# Patient Record
Sex: FEMALE | URBAN - METROPOLITAN AREA
[De-identification: names, ages, dates, MRNs, and addresses within clinical notes are randomized per-mention and may not be internally consistent; named-entity substitution may affect disease eponyms.]

---

## 2019-11-05 ENCOUNTER — OFFICE VISIT (OUTPATIENT)
Dept: FAMILY MEDICINE CLINIC | Age: 44
End: 2019-11-05

## 2019-11-05 VITALS
DIASTOLIC BLOOD PRESSURE: 77 MMHG | TEMPERATURE: 98.3 F | BODY MASS INDEX: 24.01 KG/M2 | HEART RATE: 70 BPM | RESPIRATION RATE: 16 BRPM | WEIGHT: 149.4 LBS | OXYGEN SATURATION: 99 % | SYSTOLIC BLOOD PRESSURE: 113 MMHG | HEIGHT: 66 IN

## 2019-11-05 DIAGNOSIS — Z00.00 ROUTINE GENERAL MEDICAL EXAMINATION AT A HEALTH CARE FACILITY: Primary | ICD-10-CM

## 2019-11-05 DIAGNOSIS — Z76.89 ENCOUNTER TO ESTABLISH CARE: ICD-10-CM

## 2019-11-05 DIAGNOSIS — Z80.3 FAMILY HISTORY OF BREAST CANCER IN SISTER: ICD-10-CM

## 2019-11-05 NOTE — PROGRESS NOTES
Chaparro Trevino is a 45 y.o. female (: 1975) presenting to address:    Chief Complaint   Patient presents with    New Patient       Vitals:    19 1623   BP: 113/77   Pulse: 70   Resp: 16   Temp: 98.3 °F (36.8 °C)   TempSrc: Oral   SpO2: 99%   Weight: 149 lb 6.4 oz (67.8 kg)   Height: 5' 6.11\" (1.679 m)   LMP: 10/13/2019       Hearing/Vision:   No exam data present    Learning Assessment:     Learning Assessment 2019   PRIMARY LEARNER Patient   HIGHEST LEVEL OF EDUCATION - PRIMARY LEARNER  > 4 YEARS OF COLLEGE   CO-LEARNER CAREGIVER No   PRIMARY LANGUAGE ENGLISH   LEARNER PREFERENCE PRIMARY DEMONSTRATION     PICTURES     VIDEOS   ANSWERED BY patient   RELATIONSHIP SELF     Depression Screening:     3 most recent PHQ Screens 2019   Little interest or pleasure in doing things Not at all   Feeling down, depressed, irritable, or hopeless Not at all   Total Score PHQ 2 0     Fall Risk Assessment:     Fall Risk Assessment, last 12 mths 2019   Able to walk? Yes   Fall in past 12 months? No     Abuse Screening:     Abuse Screening Questionnaire 2019   Do you ever feel afraid of your partner? N   Are you in a relationship with someone who physically or mentally threatens you? N   Is it safe for you to go home? Y     Coordination of Care Questionaire:   1. Have you been to the ER, urgent care clinic since your last visit? Hospitalized since your last visit? NO    2. Have you seen or consulted any other health care providers outside of the [address] [place] [address] since your last visit? Include any pap smears or colon screening. YES OBGYN in [place]    Advanced Directive:   1. Do you have an Advanced Directive? NO    2. Would you like information on Advanced Directives?  NO

## 2019-11-08 ENCOUNTER — HOSPITAL ENCOUNTER (OUTPATIENT)
Dept: LAB | Age: 44
Discharge: HOME | End: 2019-11-08
Payer: OTHER GOVERNMENT

## 2019-11-08 DIAGNOSIS — Z00.00 ROUTINE GENERAL MEDICAL EXAMINATION AT A HEALTH CARE FACILITY: ICD-10-CM

## 2019-11-08 LAB
ALBUMIN SERPL-MCNC: 4.7 G/DL (ref 3.4–5)
ALBUMIN/GLOB SERPL: 1.6 {RATIO} (ref 0.8–1.7)
ALP SERPL-CCNC: 66 U/L (ref 45–117)
ALT SERPL-CCNC: 24 U/L (ref 13–56)
ANION GAP SERPL CALC-SCNC: 6 MMOL/L (ref 3–18)
AST SERPL-CCNC: 14 U/L (ref 10–38)
BILIRUB DIRECT SERPL-MCNC: 0.2 MG/DL (ref 0–0.2)
BILIRUB SERPL-MCNC: 1 MG/DL (ref 0.2–1)
BUN SERPL-MCNC: 11 MG/DL (ref 7–18)
BUN/CREAT SERPL: 13 (ref 12–20)
CALCIUM SERPL-MCNC: 8.9 MG/DL (ref 8.5–10.1)
CHLORIDE SERPL-SCNC: 104 MMOL/L (ref 100–111)
CHOLEST SERPL-MCNC: 195 MG/DL
CO2 SERPL-SCNC: 30 MMOL/L (ref 21–32)
CREAT SERPL-MCNC: 0.86 MG/DL (ref 0.6–1.3)
ERYTHROCYTE [DISTWIDTH] IN BLOOD BY AUTOMATED COUNT: 13.4 % (ref 11.6–14.5)
EST. AVERAGE GLUCOSE BLD GHB EST-MCNC: 105 MG/DL
GLOBULIN SER CALC-MCNC: 2.9 G/DL (ref 2–4)
GLUCOSE SERPL-MCNC: 103 MG/DL (ref 74–99)
HBA1C MFR BLD: 5.3 % (ref 4.2–5.6)
HCT VFR BLD AUTO: 41.7 % (ref 35–45)
HDLC SERPL-MCNC: 67 MG/DL (ref 40–60)
HDLC SERPL: 2.9 {RATIO} (ref 0–5)
HGB BLD-MCNC: 13.3 G/DL (ref 12–16)
LDLC SERPL CALC-MCNC: 108 MG/DL (ref 0–100)
LIPID PROFILE,FLP: ABNORMAL
MCH RBC QN AUTO: 30 PG (ref 24–34)
MCHC RBC AUTO-ENTMCNC: 31.9 G/DL (ref 31–37)
MCV RBC AUTO: 93.9 FL (ref 74–97)
PLATELET # BLD AUTO: 270 K/UL (ref 135–420)
PMV BLD AUTO: 10.8 FL (ref 9.2–11.8)
POTASSIUM SERPL-SCNC: 4.3 MMOL/L (ref 3.5–5.5)
PROT SERPL-MCNC: 7.6 G/DL (ref 6.4–8.2)
RBC # BLD AUTO: 4.44 M/UL (ref 4.2–5.3)
SODIUM SERPL-SCNC: 140 MMOL/L (ref 136–145)
T4 FREE SERPL-MCNC: 1 NG/DL (ref 0.7–1.5)
TRIGL SERPL-MCNC: 100 MG/DL (ref ?–150)
TSH SERPL DL<=0.05 MIU/L-ACNC: 1.52 UIU/ML (ref 0.36–3.74)
VLDLC SERPL CALC-MCNC: 20 MG/DL
WBC # BLD AUTO: 6.9 K/UL (ref 4.6–13.2)

## 2019-11-08 PROCEDURE — 80061 LIPID PANEL: CPT

## 2019-11-08 PROCEDURE — 85027 COMPLETE CBC AUTOMATED: CPT

## 2019-11-08 PROCEDURE — 36415 COLL VENOUS BLD VENIPUNCTURE: CPT

## 2019-11-08 PROCEDURE — 84439 ASSAY OF FREE THYROXINE: CPT

## 2019-11-08 PROCEDURE — 83036 HEMOGLOBIN GLYCOSYLATED A1C: CPT

## 2019-11-08 PROCEDURE — 80048 BASIC METABOLIC PNL TOTAL CA: CPT

## 2019-11-08 PROCEDURE — 80076 HEPATIC FUNCTION PANEL: CPT

## 2019-11-08 PROCEDURE — 84443 ASSAY THYROID STIM HORMONE: CPT

## 2019-11-12 NOTE — PATIENT INSTRUCTIONS
Well Visit, Ages 25 to 48: Care Instructions  Your Care Instructions    Physical exams can help you stay healthy. Your doctor has checked your overall health and may have suggested ways to take good care of yourself. He or she also may have recommended tests. At home, you can help prevent illness with healthy eating, regular exercise, and other steps. Follow-up care is a key part of your treatment and safety. Be sure to make and go to all appointments, and call your doctor if you are having problems. It's also a good idea to know your test results and keep a list of the medicines you take. How can you care for yourself at home? · Reach and stay at a healthy weight. This will lower your risk for many problems, such as obesity, diabetes, heart disease, and high blood pressure. · Get at least 30 minutes of physical activity on most days of the week. Walking is a good choice. You also may want to do other activities, such as running, swimming, cycling, or playing tennis or team sports. Discuss any changes in your exercise program with your doctor. · Do not smoke or allow others to smoke around you. If you need help quitting, talk to your doctor about stop-smoking programs and medicines. These can increase your chances of quitting for good. · Talk to your doctor about whether you have any risk factors for sexually transmitted infections (STIs). Having one sex partner (who does not have STIs and does not have sex with anyone else) is a good way to avoid these infections. · Use birth control if you do not want to have children at this time. Talk with your doctor about the choices available and what might be best for you. · Protect your skin from too much sun. When you're outdoors from 10 a.m. to 4 p.m., stay in the shade or cover up with clothing and a hat with a wide brim. Wear sunglasses that block UV rays. Even when it's cloudy, put broad-spectrum sunscreen (SPF 30 or higher) on any exposed skin.   · See a dentist one or two times a year for checkups and to have your teeth cleaned. · Wear a seat belt in the car. Follow your doctor's advice about when to have certain tests. These tests can spot problems early. For everyone  · Cholesterol. Have the fat (cholesterol) in your blood tested after age 21. Your doctor will tell you how often to have this done based on your age, family history, or other things that can increase your risk for heart disease. · Blood pressure. Have your blood pressure checked during a routine doctor visit. Your doctor will tell you how often to check your blood pressure based on your age, your blood pressure results, and other factors. · Vision. Talk with your doctor about how often to have a glaucoma test.  · Diabetes. Ask your doctor whether you should have tests for diabetes. · Colon cancer. Your risk for colorectal cancer gets higher as you get older. Some experts say that adults should start regular screening at age 48 and stop at age 76. Others say to start before age 48 or continue after age 76. Talk with your doctor about your risk and when to start and stop screening. For women  · Breast exam and mammogram. Talk to your doctor about when you should have a clinical breast exam and a mammogram. Medical experts differ on whether and how often women under 50 should have these tests. Your doctor can help you decide what is right for you. · Cervical cancer screening test and pelvic exam. Begin with a Pap test at age 24. The test often is part of a pelvic exam. Starting at age 27, you may choose to have a Pap test, an HPV test, or both tests at the same time (called co-testing). Talk with your doctor about how often to have testing. · Tests for sexually transmitted infections (STIs). Ask whether you should have tests for STIs. You may be at risk if you have sex with more than one person, especially if your partners do not wear condoms.   For men  · Tests for sexually transmitted infections (STIs). Ask whether you should have tests for STIs. You may be at risk if you have sex with more than one person, especially if you do not wear a condom. · Testicular cancer exam. Ask your doctor whether you should check your testicles regularly. · Prostate exam. Talk to your doctor about whether you should have a blood test (called a PSA test) for prostate cancer. Experts differ on whether and when men should have this test. Some experts suggest it if you are older than 39 and are -American or have a father or brother who got prostate cancer when he was younger than 72. When should you call for help? Watch closely for changes in your health, and be sure to contact your doctor if you have any problems or symptoms that concern you. Where can you learn more? Go to http://jase-joe.info/. Enter P072 in the search box to learn more about \"Well Visit, Ages 25 to 48: Care Instructions. \"  Current as of: December 13, 2018  Content Version: 12.2  © 6722-5029 [employer]. Care instructions adapted under license by [employer] (which disclaims liability or warranty for this information). If you have questions about a medical condition or this instruction, always ask your healthcare professional. [place] [address] any warranty or liability for your use of this information.

## 2019-11-12 NOTE — PROGRESS NOTES
Subjective:   45 y.o. female for Well Woman Check. Her gyne and breast care is done. Patient Pap up-to-date last pregnancy less than 2 years ago. Patient's breast care overdue patient unable to get mammos due to breast-feeding times the last 5 to 7 years. Current Outpatient Medications   Medication Sig Dispense Refill    multivitamin (ONE-A-DAY ESSENTIAL PO) Take  by mouth. Allergies   Allergen Reactions    Penicillins Rash     History reviewed. No pertinent past medical history. Past Surgical History:   Procedure Laterality Date    HX ABDOMINAL LAPAROSCOPY      HX ACL RECONSTRUCTION      HX  SECTION  2018     Family History   Problem Relation Age of Onset    High Cholesterol Mother     Other Father     Breast Cancer Sister     Depression Sister      Social History     Tobacco Use    Smoking status: Never Smoker    Smokeless tobacco: Never Used   Substance Use Topics    Alcohol use: Yes     Alcohol/week: 2.0 - 4.0 standard drinks     Types: 1 - 2 Glasses of wine, 1 - 2 Cans of beer per week        Lab Results   Component Value Date/Time    WBC 6.9 2019 10:10 AM    HGB 13.3 2019 10:10 AM    HCT 41.7 2019 10:10 AM    PLATELET 288 5530 10:10 AM    MCV 93.9 2019 10:10 AM     Lab Results   Component Value Date/Time    Hemoglobin A1c 5.3 2019 10:10 AM    Glucose 103 (H) 2019 10:10 AM    LDL, calculated 108 (H) 2019 10:10 AM    Creatinine 0.86 2019 10:10 AM      Lab Results   Component Value Date/Time    Cholesterol, total 195 2019 10:10 AM    HDL Cholesterol 67 (H) 2019 10:10 AM    LDL, calculated 108 (H) 2019 10:10 AM    Triglyceride 100 2019 10:10 AM    CHOL/HDL Ratio 2.9 2019 10:10 AM     Lab Results   Component Value Date/Time    ALT (SGPT) 24 2019 10:10 AM    AST (SGOT) 14 2019 10:10 AM    Alk.  phosphatase 66 2019 10:10 AM    Bilirubin, direct 0.2 2019 10:10 AM Bilirubin, total 1.0 11/08/2019 10:10 AM    Albumin 4.7 11/08/2019 10:10 AM    Protein, total 7.6 11/08/2019 10:10 AM    PLATELET 451 97/76/7929 10:10 AM     Lab Results   Component Value Date/Time    TSH 1.52 11/08/2019 10:10 AM    T4, Free 1.0 11/08/2019 10:10 AM         Specific concerns today: Patient sister recently diagnosed with breast cancer. Patient was advised to do genetics testing which she has completed up in [facility] previous to moving here. Since [employer] they are here due to his deployment. Patient was advised due to breast-feeding could not have mammography has not ever had baseline mammography due to breast-feeding. Patient requesting MRI of bilateral breasts per recommendation of OB/GYN in [place]. Records requested. Review of Systems  A comprehensive review of systems was negative. Objective:   Blood pressure 113/77, pulse 70, temperature 98.3 °F (36.8 °C), temperature source Oral, resp. rate 16, height 5' 6.11\" (1.679 m), weight 149 lb 6.4 oz (67.8 kg), last menstrual period 10/13/2019, SpO2 99 %.    Physical Examination:   General appearance - alert, well appearing, and in no distress  Mental status - alert, oriented to person, place, and time, normal mood, behavior, speech, dress, motor activity, and thought processes  Eyes - pupils equal and reactive, extraocular eye movements intact  Ears - bilateral TM's and external ear canals normal  Nose - normal and patent, no erythema, discharge or polyps  Mouth - mucous membranes moist, pharynx normal without lesions  Neck - supple, no significant adenopathy  Lymphatics - no palpable lymphadenopathy, no hepatosplenomegaly  Chest - clear to auscultation, no wheezes, rales or rhonchi, symmetric air entry  Heart - normal rate, regular rhythm, normal S1, S2, no murmurs, rubs, clicks or gallops  Abdomen - soft, nontender, nondistended, no masses or organomegaly  Breasts - patient declines to have breast exam d/t breast feeding tenderness  Musculoskeletal - no joint tenderness, deformity or swelling  Extremities - peripheral pulses normal, no pedal edema, no clubbing or cyanosis  Skin - normal coloration and turgor, no rashes, no suspicious skin lesions noted     Assessment/Plan:   Records requested from OB/GYN in [facility] once reviewed will order testing associated with recommendation. Patient aware we will be following up once records received. continue present diet with no restrictions, continue present plan, routine labs ordered, call if any problems  Encounter Diagnoses   Name Primary?     Routine general medical examination at a health care facility Yes    Family history of breast cancer in sister Boubacar Luna Encounter to establish care      Orders Placed This Encounter    MRI BREAST BI W WO CONT    CBC W/O DIFF    METABOLIC PANEL, BASIC    HEPATIC FUNCTION PANEL    LIPID PANEL    TSH 3RD GENERATION    T4, FREE    HEMOGLOBIN A1C WITH EAG    multivitamin (ONE-A-DAY ESSENTIAL PO)     Return to care yearly and as needed

## 2019-12-04 ENCOUNTER — OFFICE VISIT (OUTPATIENT)
Dept: FAMILY MEDICINE CLINIC | Age: 44
End: 2019-12-04

## 2019-12-04 VITALS
DIASTOLIC BLOOD PRESSURE: 62 MMHG | HEIGHT: 66 IN | HEART RATE: 66 BPM | SYSTOLIC BLOOD PRESSURE: 101 MMHG | TEMPERATURE: 97.5 F | RESPIRATION RATE: 16 BRPM | BODY MASS INDEX: 24.11 KG/M2 | OXYGEN SATURATION: 98 % | WEIGHT: 150 LBS

## 2019-12-04 DIAGNOSIS — Z00.00 ROUTINE GENERAL MEDICAL EXAMINATION AT A HEALTH CARE FACILITY: Primary | ICD-10-CM

## 2019-12-04 DIAGNOSIS — Z80.3 FAMILY HISTORY OF BREAST CANCER IN SISTER: ICD-10-CM

## 2019-12-04 NOTE — PROGRESS NOTES
Sailaja Bai is a 45 y.o. female (: 1975) presenting to address:    Chief Complaint   Patient presents with    Complete Physical       Vitals:    19 1629   BP: 101/62   Pulse: 66   Resp: 16   Temp: 97.5 °F (36.4 °C)   TempSrc: Oral   SpO2: 98%   Weight: 150 lb (68 kg)   Height: 5' 6.11\" (1.679 m)   PainSc:   0 - No pain   LMP: 2019       Hearing/Vision:   No exam data present    Learning Assessment:     Learning Assessment 2019   PRIMARY LEARNER Patient   HIGHEST LEVEL OF EDUCATION - PRIMARY LEARNER  > 4 YEARS OF COLLEGE   CO-LEARNER CAREGIVER No   PRIMARY LANGUAGE ENGLISH   LEARNER PREFERENCE PRIMARY DEMONSTRATION     PICTURES     VIDEOS   ANSWERED BY patient   RELATIONSHIP SELF     Depression Screening:     3 most recent PHQ Screens 2019   Little interest or pleasure in doing things Not at all   Feeling down, depressed, irritable, or hopeless Not at all   Total Score PHQ 2 0     Fall Risk Assessment:     Fall Risk Assessment, last 12 mths 2019   Able to walk? Yes   Fall in past 12 months? No     Abuse Screening:     Abuse Screening Questionnaire 2019   Do you ever feel afraid of your partner? N   Are you in a relationship with someone who physically or mentally threatens you? N   Is it safe for you to go home? Y     Coordination of Care Questionaire:   1. Have you been to the ER, urgent care clinic since your last visit? Hospitalized since your last visit? NO    2. Have you seen or consulted any other health care providers outside of the [address] [place] [address] since your last visit? Include any pap smears or colon screening. YES    Advanced Directive:   1. Do you have an Advanced Directive? NO    2. Would you like information on Advanced Directives?  NO

## 2019-12-04 NOTE — PATIENT INSTRUCTIONS
Well Visit, Ages 25 to 48: Care Instructions  Your Care Instructions    Physical exams can help you stay healthy. Your doctor has checked your overall health and may have suggested ways to take good care of yourself. He or she also may have recommended tests. At home, you can help prevent illness with healthy eating, regular exercise, and other steps. Follow-up care is a key part of your treatment and safety. Be sure to make and go to all appointments, and call your doctor if you are having problems. It's also a good idea to know your test results and keep a list of the medicines you take. How can you care for yourself at home? · Reach and stay at a healthy weight. This will lower your risk for many problems, such as obesity, diabetes, heart disease, and high blood pressure. · Get at least 30 minutes of physical activity on most days of the week. Walking is a good choice. You also may want to do other activities, such as running, swimming, cycling, or playing tennis or team sports. Discuss any changes in your exercise program with your doctor. · Do not smoke or allow others to smoke around you. If you need help quitting, talk to your doctor about stop-smoking programs and medicines. These can increase your chances of quitting for good. · Talk to your doctor about whether you have any risk factors for sexually transmitted infections (STIs). Having one sex partner (who does not have STIs and does not have sex with anyone else) is a good way to avoid these infections. · Use birth control if you do not want to have children at this time. Talk with your doctor about the choices available and what might be best for you. · Protect your skin from too much sun. When you're outdoors from 10 a.m. to 4 p.m., stay in the shade or cover up with clothing and a hat with a wide brim. Wear sunglasses that block UV rays. Even when it's cloudy, put broad-spectrum sunscreen (SPF 30 or higher) on any exposed skin.   · See a dentist one or two times a year for checkups and to have your teeth cleaned. · Wear a seat belt in the car. Follow your doctor's advice about when to have certain tests. These tests can spot problems early. For everyone  · Cholesterol. Have the fat (cholesterol) in your blood tested after age 21. Your doctor will tell you how often to have this done based on your age, family history, or other things that can increase your risk for heart disease. · Blood pressure. Have your blood pressure checked during a routine doctor visit. Your doctor will tell you how often to check your blood pressure based on your age, your blood pressure results, and other factors. · Vision. Talk with your doctor about how often to have a glaucoma test.  · Diabetes. Ask your doctor whether you should have tests for diabetes. · Colon cancer. Your risk for colorectal cancer gets higher as you get older. Some experts say that adults should start regular screening at age 48 and stop at age 76. Others say to start before age 48 or continue after age 76. Talk with your doctor about your risk and when to start and stop screening. For women  · Breast exam and mammogram. Talk to your doctor about when you should have a clinical breast exam and a mammogram. Medical experts differ on whether and how often women under 50 should have these tests. Your doctor can help you decide what is right for you. · Cervical cancer screening test and pelvic exam. Begin with a Pap test at age 24. The test often is part of a pelvic exam. Starting at age 27, you may choose to have a Pap test, an HPV test, or both tests at the same time (called co-testing). Talk with your doctor about how often to have testing. · Tests for sexually transmitted infections (STIs). Ask whether you should have tests for STIs. You may be at risk if you have sex with more than one person, especially if your partners do not wear condoms.   For men  · Tests for sexually transmitted infections (STIs). Ask whether you should have tests for STIs. You may be at risk if you have sex with more than one person, especially if you do not wear a condom. · Testicular cancer exam. Ask your doctor whether you should check your testicles regularly. · Prostate exam. Talk to your doctor about whether you should have a blood test (called a PSA test) for prostate cancer. Experts differ on whether and when men should have this test. Some experts suggest it if you are older than 39 and are -American or have a father or brother who got prostate cancer when he was younger than 72. When should you call for help? Watch closely for changes in your health, and be sure to contact your doctor if you have any problems or symptoms that concern you. Where can you learn more? Go to http://jase-joe.info/. Enter P072 in the search box to learn more about \"Well Visit, Ages 25 to 48: Care Instructions. \"  Current as of: December 13, 2018  Content Version: 12.2  © 8925-4904 [employer]. Care instructions adapted under license by [employer] (which disclaims liability or warranty for this information). If you have questions about a medical condition or this instruction, always ask your healthcare professional. [place] [address] any warranty or liability for your use of this information.

## 2019-12-09 ENCOUNTER — TELEPHONE (OUTPATIENT)
Dept: FAMILY MEDICINE CLINIC | Age: 44
End: 2019-12-09

## 2019-12-09 NOTE — TELEPHONE ENCOUNTER
Please call imaging center and explain     Patient is  here from [facility] for 1 year with spouse who is here for [employer]. Patient has been breast-feeding over the last 7 years between pregnancies and has never had an initial mammogram.  Her OB/GYN recommended MRI as mamma would be in conclusive due to breast-feeding. Patient sister recently diagnosed with breast cancer patient's marker test is also positive. Patient's OB/GYN notes reviewed from [facility] with recommendation of MRI due to these factors order placed for that reason should be able to facilitate this based off of history, current genetic testing, and lack of mammogram due to childbearing years.

## 2019-12-09 NOTE — TELEPHONE ENCOUNTER
Jessenia Rodgers from [facility] called stating that this patient doesn't have any current imaging on file and due to this the insurance might not pay for the MRI. The patient is scheduled for 12/13/19.  Please advise

## 2019-12-11 NOTE — TELEPHONE ENCOUNTER
Spoke with dillan and gave marie's message.    I also faxed the message along with [phone] notes  Confirmation received    Faxed to [phone]

## 2019-12-12 ENCOUNTER — TELEPHONE (OUTPATIENT)
Dept: FAMILY MEDICINE CLINIC | Age: 44
End: 2019-12-12

## 2019-12-16 ENCOUNTER — TELEPHONE (OUTPATIENT)
Dept: FAMILY MEDICINE CLINIC | Age: 44
End: 2019-12-16

## 2019-12-16 DIAGNOSIS — R92.8 ABNORMAL MRI, BREAST: Primary | ICD-10-CM

## 2019-12-16 NOTE — TELEPHONE ENCOUNTER
Alexsandra radiologist called from [address] and stated abnormal findings on MRI  Patient needs Mammo and ultrasound, then LT breast biopsy.

## 2019-12-17 NOTE — PROGRESS NOTES
Subjective:   45 y.o. female for Well Woman Check. Her gyne and breast care is done elsewhere by her Ob-Gyne physician. Current Outpatient Medications   Medication Sig Dispense Refill    multivitamin (ONE-A-DAY ESSENTIAL PO) Take  by mouth. Allergies   Allergen Reactions    Penicillins Rash     No past medical history on file. Past Surgical History:   Procedure Laterality Date    HX ABDOMINAL LAPAROSCOPY      HX ACL RECONSTRUCTION  1999    HX  SECTION  2018     Family History   Problem Relation Age of Onset    High Cholesterol Mother     Other Father     Breast Cancer Sister     Depression Sister      Social History     Tobacco Use    Smoking status: Never Smoker    Smokeless tobacco: Never Used   Substance Use Topics    Alcohol use: Yes     Alcohol/week: 2.0 - 4.0 standard drinks     Types: 1 - 2 Glasses of wine, 1 - 2 Cans of beer per week        Lab Results   Component Value Date/Time    WBC 6.9 2019 10:10 AM    HGB 13.3 2019 10:10 AM    HCT 41.7 2019 10:10 AM    PLATELET 030  10:10 AM    MCV 93.9 2019 10:10 AM     Lab Results   Component Value Date/Time    Hemoglobin A1c 5.3 2019 10:10 AM    Glucose 103 (H) 2019 10:10 AM    LDL, calculated 108 (H) 2019 10:10 AM    Creatinine 0.86 2019 10:10 AM      Lab Results   Component Value Date/Time    Cholesterol, total 195 2019 10:10 AM    HDL Cholesterol 67 (H) 2019 10:10 AM    LDL, calculated 108 (H) 2019 10:10 AM    Triglyceride 100 2019 10:10 AM    CHOL/HDL Ratio 2.9 2019 10:10 AM     Lab Results   Component Value Date/Time    ALT (SGPT) 24 2019 10:10 AM    AST (SGOT) 14 2019 10:10 AM    Alk.  phosphatase 66 2019 10:10 AM    Bilirubin, direct 0.2 2019 10:10 AM    Bilirubin, total 1.0 2019 10:10 AM    Albumin 4.7 2019 10:10 AM    Protein, total 7.6 2019 10:10 AM    PLATELET 995  10:10 AM     Lab Results   Component Value Date/Time    TSH 1.52 11/08/2019 10:10 AM    T4, Free 1.0 11/08/2019 10:10 AM         Review of Systems  A comprehensive review of systems was negative. Objective:   Blood pressure 101/62, pulse 66, temperature 97.5 °F (36.4 °C), temperature source Oral, resp. rate 16, height 5' 6.11\" (1.679 m), weight 150 lb (68 kg), last menstrual period 12/02/2019, SpO2 98 %. Physical Examination:   General appearance - alert, well appearing, and in no distress  Mental status - alert, oriented to person, place, and time, normal mood, behavior, speech, dress, motor activity, and thought processes  Eyes - pupils equal and reactive, extraocular eye movements intact  Ears - bilateral TM's and external ear canals normal  Nose - normal and patent, no erythema, discharge or polyps  Mouth - mucous membranes moist, pharynx normal without lesions  Neck - supple, no significant adenopathy, thyroid exam: thyroid is normal in size without nodules or tenderness  Lymphatics - no palpable lymphadenopathy, no hepatosplenomegaly  Chest - clear to auscultation, no wheezes, rales or rhonchi, symmetric air entry  Heart - normal rate, regular rhythm, normal S1, S2, no murmurs, rubs, clicks or gallops  Abdomen - soft, nontender, nondistended, no masses or organomegaly  Neurological - alert, oriented, normal speech, no focal findings or movement disorder noted, not examined  Musculoskeletal - no joint tenderness, deformity or swelling  Extremities - peripheral pulses normal, no pedal edema, no clubbing or cyanosis  Skin - normal coloration and turgor, no rashes, no suspicious skin lesions noted     Assessment/Plan:   Labs reviewed with patient from 11/2019  continue present diet with no restrictions, continue present plan, have labs drawn prior to ROV, call if any problems  Encounter Diagnoses   Name Primary?     Routine general medical examination at a health care facility Yes    Family history of breast cancer in sister Return to care yearly and as needed

## 2019-12-18 ENCOUNTER — TELEPHONE (OUTPATIENT)
Dept: FAMILY MEDICINE CLINIC | Age: 44
End: 2019-12-18

## 2019-12-18 NOTE — TELEPHONE ENCOUNTER
[facility]  Confirmation received and order put in scanning.

## 2019-12-18 NOTE — TELEPHONE ENCOUNTER
Pt has scheduled her US of the breast and the facility needs us to change the order because the current order is actually 2 diffennt types rolled into one. They need us to remove the fine needle part and have it say US guided left breast biopsy. Her appt is on 12/23/19.  Please advise     Fax [phone]

## 2020-01-03 DIAGNOSIS — Z80.3 FAMILY HISTORY OF BREAST CANCER IN SISTER: ICD-10-CM

## 2020-01-07 ENCOUNTER — TELEPHONE (OUTPATIENT)
Dept: FAMILY MEDICINE CLINIC | Age: 45
End: 2020-01-07

## 2020-01-07 DIAGNOSIS — R92.8 ABNORMAL MRI, BREAST: Primary | ICD-10-CM

## 2020-01-09 DIAGNOSIS — R92.8 ABNORMAL MRI, BREAST: ICD-10-CM

## 2020-07-01 ENCOUNTER — TELEPHONE (OUTPATIENT)
Dept: FAMILY MEDICINE CLINIC | Age: 45
End: 2020-07-01

## 2020-07-01 DIAGNOSIS — R92.8 ABNORMAL MRI, BREAST: Primary | ICD-10-CM

## 2020-07-01 NOTE — TELEPHONE ENCOUNTER
Patient needs a mammogram order put in that she gets one every 6 months. She sees you in August for a transition of care visit.

## 2020-07-01 NOTE — TELEPHONE ENCOUNTER
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  She goes to [facility]. Please advise     Future Appointments   Date Time Provider [address] [place]   8/11/2020 10:00 AM Jayden Nunez MD [facility]